# Patient Record
(demographics unavailable — no encounter records)

---

## 2025-03-26 NOTE — HEALTH RISK ASSESSMENT
[No] : No [No falls in past year] : Patient reported no falls in the past year [0] : 2) Feeling down, depressed, or hopeless: Not at all (0) [PHQ-2 Negative - No further assessment needed] : PHQ-2 Negative - No further assessment needed [Never] : Never [[id number]] : 0

## 2025-03-26 NOTE — PLAN
[FreeTextEntry1] : Screening for metabolic disorders - will check labs MVP - f/u with cardiology HMT - UTD w/ cancer screenings

## 2025-03-26 NOTE — HISTORY OF PRESENT ILLNESS
[Family Member] : family member [FreeTextEntry1] : follow-up, annual visit [de-identified] : 57 year old female here for a f/u visit. Currently she has no acute medical complaints.  She denies any chest pain, palpitations or shortness of breath.

## 2025-07-02 NOTE — PLAN
[FreeTextEntry1] : 58 year  old female  presents for annual visit.  HCM -Pap done today -Rx for mammo/sono  -rx pelvic sono f/u stable paraovarian cyst  -Discussed OTC supplements  -Pt given therapy resources  -to have breast and pelvic imaging sent to our office from [facility]  -Will give dexa script at age 60, pt declines currently  RTO 1 yr for annual exam

## 2025-07-02 NOTE — HISTORY OF PRESENT ILLNESS
[FreeTextEntry1] : NORMA WALLIS is a 58 year old presenting for annual GYN exam. Pt reports a recent long-term relationship separation, feeling emotional, pt reports feeling stunted. Pt reports decreased libido and vaginal dryness with intercourse. Denies hot flashes.   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

## 2025-07-02 NOTE — END OF VISIT
[FreeTextEntry3] :   I, Angeles Danni, acted as a scribe on behalf of Dr. Rufina Cervantes M.D  on 06/30/2025.   All medical entries made by the scribe were at my, Dr. Rufina Cervantes M.D ,  direction and personally dictated by me on 06/30/2025. I have reviewed the chart and agree that the record accurately reflects my personal performance of the history, physical exam, assessment and plan. I have also personally directed, reviewed, and agreed with the chart.